# Patient Record
Sex: FEMALE | Race: BLACK OR AFRICAN AMERICAN | NOT HISPANIC OR LATINO
[De-identification: names, ages, dates, MRNs, and addresses within clinical notes are randomized per-mention and may not be internally consistent; named-entity substitution may affect disease eponyms.]

---

## 2017-02-15 ENCOUNTER — APPOINTMENT (OUTPATIENT)
Dept: OTOLARYNGOLOGY | Facility: CLINIC | Age: 77
End: 2017-02-15

## 2017-02-15 VITALS
HEART RATE: 76 BPM | DIASTOLIC BLOOD PRESSURE: 71 MMHG | HEIGHT: 63 IN | BODY MASS INDEX: 27.46 KG/M2 | SYSTOLIC BLOOD PRESSURE: 173 MMHG | WEIGHT: 155 LBS | TEMPERATURE: 98 F

## 2017-02-15 DIAGNOSIS — R49.0 DYSPHONIA: ICD-10-CM

## 2017-02-15 DIAGNOSIS — J32.8 OTHER CHRONIC SINUSITIS: ICD-10-CM

## 2017-02-15 DIAGNOSIS — J38.01 PARALYSIS OF VOCAL CORDS AND LARYNX, UNILATERAL: ICD-10-CM

## 2017-02-15 DIAGNOSIS — J38.2 NODULES OF VOCAL CORDS: ICD-10-CM

## 2017-02-28 ENCOUNTER — OUTPATIENT (OUTPATIENT)
Dept: OUTPATIENT SERVICES | Facility: HOSPITAL | Age: 77
LOS: 1 days | End: 2017-02-28
Payer: MEDICARE

## 2017-02-28 PROCEDURE — 70486 CT MAXILLOFACIAL W/O DYE: CPT | Mod: 26

## 2017-02-28 PROCEDURE — 70486 CT MAXILLOFACIAL W/O DYE: CPT

## 2017-03-07 ENCOUNTER — APPOINTMENT (OUTPATIENT)
Dept: OTOLARYNGOLOGY | Facility: CLINIC | Age: 77
End: 2017-03-07

## 2017-03-31 ENCOUNTER — APPOINTMENT (OUTPATIENT)
Dept: OTOLARYNGOLOGY | Facility: CLINIC | Age: 77
End: 2017-03-31

## 2017-03-31 VITALS
SYSTOLIC BLOOD PRESSURE: 147 MMHG | TEMPERATURE: 97.5 F | DIASTOLIC BLOOD PRESSURE: 75 MMHG | WEIGHT: 155 LBS | HEIGHT: 63 IN | BODY MASS INDEX: 27.46 KG/M2 | HEART RATE: 86 BPM

## 2017-03-31 DIAGNOSIS — Z82.49 FAMILY HISTORY OF ISCHEMIC HEART DISEASE AND OTHER DISEASES OF THE CIRCULATORY SYSTEM: ICD-10-CM

## 2017-03-31 DIAGNOSIS — Z86.79 PERSONAL HISTORY OF OTHER DISEASES OF THE CIRCULATORY SYSTEM: ICD-10-CM

## 2017-03-31 DIAGNOSIS — Z86.39 PERSONAL HISTORY OF OTHER ENDOCRINE, NUTRITIONAL AND METABOLIC DISEASE: ICD-10-CM

## 2017-03-31 DIAGNOSIS — R43.9 UNSPECIFIED DISTURBANCES OF SMELL AND TASTE: ICD-10-CM

## 2017-03-31 DIAGNOSIS — R51 HEADACHE: ICD-10-CM

## 2017-04-12 ENCOUNTER — APPOINTMENT (OUTPATIENT)
Dept: NEUROLOGY | Facility: CLINIC | Age: 77
End: 2017-04-12

## 2017-04-12 VITALS
BODY MASS INDEX: 26.58 KG/M2 | SYSTOLIC BLOOD PRESSURE: 139 MMHG | TEMPERATURE: 98.4 F | OXYGEN SATURATION: 97 % | HEART RATE: 78 BPM | DIASTOLIC BLOOD PRESSURE: 72 MMHG | HEIGHT: 63 IN | WEIGHT: 150 LBS

## 2017-04-12 RX ORDER — BLOOD SUGAR DIAGNOSTIC
STRIP MISCELLANEOUS
Qty: 50 | Refills: 0 | Status: ACTIVE | COMMUNITY
Start: 2017-03-23

## 2017-04-12 RX ORDER — METFORMIN HYDROCHLORIDE 850 MG/1
850 TABLET, COATED ORAL TWICE DAILY
Refills: 0 | Status: ACTIVE | COMMUNITY

## 2017-04-12 RX ORDER — CHOLECALCIFEROL (VITAMIN D3) 10(400)/ML
DROPS ORAL
Qty: 100 | Refills: 0 | Status: ACTIVE | COMMUNITY
Start: 2017-03-23

## 2017-04-12 RX ORDER — LOSARTAN POTASSIUM 25 MG/1
25 TABLET, FILM COATED ORAL DAILY
Refills: 0 | Status: ACTIVE | COMMUNITY

## 2017-04-12 RX ORDER — LOVASTATIN 40 MG/1
40 TABLET ORAL DAILY
Refills: 0 | Status: ACTIVE | COMMUNITY

## 2017-04-12 RX ORDER — LEVOTHYROXINE SODIUM 0.03 MG/1
25 TABLET ORAL DAILY
Refills: 0 | Status: ACTIVE | COMMUNITY

## 2017-04-12 RX ORDER — BLOOD-GLUCOSE METER
70 EACH MISCELLANEOUS
Qty: 100 | Refills: 0 | Status: ACTIVE | COMMUNITY
Start: 2017-03-24

## 2017-04-12 RX ORDER — CARVEDILOL 3.12 MG/1
3.12 TABLET, FILM COATED ORAL
Refills: 0 | Status: ACTIVE | COMMUNITY

## 2017-05-24 ENCOUNTER — APPOINTMENT (OUTPATIENT)
Dept: NEUROLOGY | Facility: CLINIC | Age: 77
End: 2017-05-24

## 2017-05-24 VITALS
WEIGHT: 150 LBS | SYSTOLIC BLOOD PRESSURE: 152 MMHG | DIASTOLIC BLOOD PRESSURE: 70 MMHG | TEMPERATURE: 98.4 F | OXYGEN SATURATION: 98 % | BODY MASS INDEX: 26.58 KG/M2 | HEIGHT: 63 IN | HEART RATE: 77 BPM

## 2017-05-30 ENCOUNTER — APPOINTMENT (OUTPATIENT)
Dept: NEUROLOGY | Facility: CLINIC | Age: 77
End: 2017-05-30

## 2017-05-30 VITALS
OXYGEN SATURATION: 100 % | SYSTOLIC BLOOD PRESSURE: 150 MMHG | HEART RATE: 69 BPM | DIASTOLIC BLOOD PRESSURE: 75 MMHG | BODY MASS INDEX: 26.58 KG/M2 | HEIGHT: 63 IN | WEIGHT: 150 LBS

## 2017-07-26 ENCOUNTER — APPOINTMENT (OUTPATIENT)
Dept: NEUROLOGY | Facility: CLINIC | Age: 77
End: 2017-07-26

## 2018-06-27 ENCOUNTER — APPOINTMENT (OUTPATIENT)
Dept: ENDOCRINOLOGY | Facility: CLINIC | Age: 78
End: 2018-06-27
Payer: MEDICARE

## 2018-06-27 PROCEDURE — 99204 OFFICE O/P NEW MOD 45 MIN: CPT

## 2018-06-28 VITALS
HEART RATE: 77 BPM | WEIGHT: 146 LBS | HEIGHT: 63 IN | SYSTOLIC BLOOD PRESSURE: 127 MMHG | DIASTOLIC BLOOD PRESSURE: 61 MMHG | BODY MASS INDEX: 25.87 KG/M2

## 2018-07-02 LAB
24R-OH-CALCIDIOL SERPL-MCNC: 122 PG/ML
25(OH)D3 SERPL-MCNC: 33.7 NG/ML
ALBUMIN SERPL ELPH-MCNC: 4.4 G/DL
ALP BLD-CCNC: 82 U/L
ALT SERPL-CCNC: 17 U/L
ANION GAP SERPL CALC-SCNC: 16 MMOL/L
AST SERPL-CCNC: 17 U/L
BASOPHILS # BLD AUTO: 0.01 K/UL
BASOPHILS NFR BLD AUTO: 0.2 %
BILIRUB SERPL-MCNC: 0.3 MG/DL
BUN SERPL-MCNC: 11 MG/DL
CALCIUM SERPL-MCNC: 10.5 MG/DL
CALCIUM SERPL-MCNC: 10.5 MG/DL
CHLORIDE SERPL-SCNC: 102 MMOL/L
CHOLEST SERPL-MCNC: 147 MG/DL
CHOLEST/HDLC SERPL: 2 RATIO
CO2 SERPL-SCNC: 24 MMOL/L
CREAT SERPL-MCNC: 0.73 MG/DL
CREAT SPEC-SCNC: 53 MG/DL
DHEA-SULFATE, SERUM: 50 UG/DL
EOSINOPHIL # BLD AUTO: 0.08 K/UL
EOSINOPHIL NFR BLD AUTO: 1.7 %
GLUCOSE SERPL-MCNC: 142 MG/DL
HBA1C MFR BLD HPLC: 7.4 %
HCT VFR BLD CALC: 41.2 %
HDLC SERPL-MCNC: 73 MG/DL
HGB BLD-MCNC: 13.3 G/DL
IMM GRANULOCYTES NFR BLD AUTO: 0.2 %
LDLC SERPL CALC-MCNC: 45 MG/DL
LYMPHOCYTES # BLD AUTO: 1.64 K/UL
LYMPHOCYTES NFR BLD AUTO: 34.5 %
MAN DIFF?: NORMAL
MCHC RBC-ENTMCNC: 27 PG
MCHC RBC-ENTMCNC: 32.3 GM/DL
MCV RBC AUTO: 83.6 FL
MICROALBUMIN 24H UR DL<=1MG/L-MCNC: <0.3 MG/DL
MICROALBUMIN/CREAT 24H UR-RTO: NORMAL
MONOCYTES # BLD AUTO: 0.4 K/UL
MONOCYTES NFR BLD AUTO: 8.4 %
NEUTROPHILS # BLD AUTO: 2.61 K/UL
NEUTROPHILS NFR BLD AUTO: 55 %
PARATHYROID HORMONE INTACT: 28 PG/ML
PLATELET # BLD AUTO: 255 K/UL
POTASSIUM SERPL-SCNC: 3.8 MMOL/L
PROT SERPL-MCNC: 7.2 G/DL
RBC # BLD: 4.93 M/UL
RBC # FLD: 13.9 %
SODIUM SERPL-SCNC: 142 MMOL/L
T3FREE SERPL-MCNC: 2.73 PG/ML
T4 FREE SERPL-MCNC: 1.4 NG/DL
TESTOST BND SERPL-MCNC: 2 PG/ML
TESTOST SERPL-MCNC: 26.5 NG/DL
THYROGLOB AB SERPL-ACNC: <20 IU/ML
THYROPEROXIDASE AB SERPL IA-ACNC: 385 IU/ML
TRIGL SERPL-MCNC: 147 MG/DL
TSH SERPL-ACNC: 2.04 UIU/ML
TSI ACT/NOR SER: <0.1 IU/L
WBC # FLD AUTO: 4.75 K/UL

## 2018-10-04 ENCOUNTER — EMERGENCY (EMERGENCY)
Facility: HOSPITAL | Age: 78
LOS: 1 days | Discharge: HOME | End: 2018-10-04
Admitting: EMERGENCY MEDICINE
Payer: COMMERCIAL

## 2018-10-04 VITALS
WEIGHT: 148.59 LBS | HEIGHT: 63 IN | OXYGEN SATURATION: 98 % | SYSTOLIC BLOOD PRESSURE: 191 MMHG | DIASTOLIC BLOOD PRESSURE: 71 MMHG | RESPIRATION RATE: 18 BRPM | TEMPERATURE: 98 F | HEART RATE: 82 BPM

## 2018-10-04 DIAGNOSIS — Z79.899 OTHER LONG TERM (CURRENT) DRUG THERAPY: ICD-10-CM

## 2018-10-04 DIAGNOSIS — M79.18 MYALGIA, OTHER SITE: ICD-10-CM

## 2018-10-04 DIAGNOSIS — Z79.1 LONG TERM (CURRENT) USE OF NON-STEROIDAL ANTI-INFLAMMATORIES (NSAID): ICD-10-CM

## 2018-10-04 DIAGNOSIS — R10.30 LOWER ABDOMINAL PAIN, UNSPECIFIED: ICD-10-CM

## 2018-10-04 DIAGNOSIS — I10 ESSENTIAL (PRIMARY) HYPERTENSION: ICD-10-CM

## 2018-10-04 PROCEDURE — 99283 EMERGENCY DEPT VISIT LOW MDM: CPT

## 2018-10-04 PROCEDURE — 96372 THER/PROPH/DIAG INJ SC/IM: CPT

## 2018-10-04 PROCEDURE — 73502 X-RAY EXAM HIP UNI 2-3 VIEWS: CPT | Mod: 26,LT

## 2018-10-04 PROCEDURE — 73502 X-RAY EXAM HIP UNI 2-3 VIEWS: CPT

## 2018-10-04 PROCEDURE — 99283 EMERGENCY DEPT VISIT LOW MDM: CPT | Mod: 25

## 2018-10-04 RX ORDER — METHOCARBAMOL 500 MG/1
1 TABLET, FILM COATED ORAL
Qty: 15 | Refills: 0 | OUTPATIENT
Start: 2018-10-04 | End: 2018-10-08

## 2018-10-04 RX ORDER — KETOROLAC TROMETHAMINE 30 MG/ML
15 SYRINGE (ML) INJECTION ONCE
Qty: 0 | Refills: 0 | Status: DISCONTINUED | OUTPATIENT
Start: 2018-10-04 | End: 2018-10-04

## 2018-10-04 RX ADMIN — Medication 15 MILLIGRAM(S): at 14:34

## 2018-10-04 NOTE — ED PROVIDER NOTE - PHYSICAL EXAMINATION
Left groin, no swelling, erythema, cellulitis, no mass. No back or spinal pain. no motor or sensory deficit, NVI.  Pain was reproducible on exam.

## 2018-10-04 NOTE — ED PROVIDER NOTE - MEDICAL DECISION MAKING DETAILS
Patient with groin pain most likely a muscle strain. Pain was reproducible on exam. Able to ambulate , x rays negative. Recommend f/u with ortho. Ice, rest and heat. Pain meds prn

## 2018-10-04 NOTE — ED ADULT TRIAGE NOTE - CHIEF COMPLAINT QUOTE
c/o left groin pain travelling to E and buttocks x 1 week. Pain is worse with walking. Denies recent fall or injury

## 2018-10-04 NOTE — ED PROVIDER NOTE - CHPI ED SYMPTOMS NEG
no hematuria/no dysuria/no fever/no burning urination/no abdominal distension/no vomiting/no chills/no nausea/no diarrhea

## 2018-10-04 NOTE — ED ADULT NURSE NOTE - OBJECTIVE STATEMENT
c/o left groin pain travelling to E and buttocks x 1 week. Pain is worse with walking. Denies recent fall or injury, burning/frequency in urination

## 2018-10-04 NOTE — ED ADULT NURSE NOTE - NSIMPLEMENTINTERV_GEN_ALL_ED
Implemented All Universal Safety Interventions:  [place] to call system. Call bell, personal items and telephone within reach. Instruct patient to call for assistance. Room bathroom lighting operational. Non-slip footwear when patient is off stretcher. Physically safe environment: no spills, clutter or unnecessary equipment. Stretcher in lowest position, wheels locked, appropriate side rails in place.

## 2018-10-04 NOTE — ED PROVIDER NOTE - OBJECTIVE STATEMENT
78 y/o f with c/o left groin pain worsen with movement upon standing and walking. She state of similar episode in the past with the same strain. She report of moving furniture in her home. She state of pain unrelieved with OTC med. Denies fall, injury, abd pain, dysuria, paresthesia, paresis, incontinence. No sob, chest pain, n, v, d, or h/o joint surgeries.

## 2019-05-09 PROBLEM — I10 ESSENTIAL (PRIMARY) HYPERTENSION: Chronic | Status: ACTIVE | Noted: 2018-10-09

## 2019-06-20 ENCOUNTER — APPOINTMENT (OUTPATIENT)
Dept: ENDOCRINOLOGY | Facility: CLINIC | Age: 79
End: 2019-06-20
Payer: MEDICARE

## 2019-06-20 VITALS
WEIGHT: 146 LBS | DIASTOLIC BLOOD PRESSURE: 77 MMHG | BODY MASS INDEX: 25.87 KG/M2 | SYSTOLIC BLOOD PRESSURE: 126 MMHG | HEIGHT: 63 IN | HEART RATE: 80 BPM

## 2019-06-20 DIAGNOSIS — E03.9 HYPOTHYROIDISM, UNSPECIFIED: ICD-10-CM

## 2019-06-20 DIAGNOSIS — L81.9 DISORDER OF PIGMENTATION, UNSPECIFIED: ICD-10-CM

## 2019-06-20 DIAGNOSIS — E11.65 TYPE 2 DIABETES MELLITUS WITH HYPERGLYCEMIA: ICD-10-CM

## 2019-06-20 DIAGNOSIS — N95.1 MENOPAUSAL AND FEMALE CLIMACTERIC STATES: ICD-10-CM

## 2019-06-20 LAB
GLUCOSE BLDC GLUCOMTR-MCNC: 183
HBA1C MFR BLD HPLC: 7.4

## 2019-06-20 PROCEDURE — 82962 GLUCOSE BLOOD TEST: CPT

## 2019-06-20 PROCEDURE — 83036 HEMOGLOBIN GLYCOSYLATED A1C: CPT | Mod: QW

## 2019-06-20 PROCEDURE — 99215 OFFICE O/P EST HI 40 MIN: CPT | Mod: 25

## 2019-06-21 NOTE — PHYSICAL EXAM
[Alert] : alert [No Acute Distress] : no acute distress [Well Nourished] : well nourished [Well Developed] : well developed [Normal Sclera/Conjunctiva] : normal sclera/conjunctiva [EOMI] : extra ocular movement intact [No Proptosis] : no proptosis [Thyroid Not Enlarged] : the thyroid was not enlarged [Normal Oropharynx] : the oropharynx was normal [No Thyroid Nodules] : there were no palpable thyroid nodules [No Respiratory Distress] : no respiratory distress [No Accessory Muscle Use] : no accessory muscle use [Clear to Auscultation] : lungs were clear to auscultation bilaterally [Normal Rate] : heart rate was normal  [Normal S1, S2] : normal S1 and S2 [Regular Rhythm] : with a regular rhythm [Pedal Pulses Normal] : the pedal pulses are present [No Edema] : there was no peripheral edema [Normal Bowel Sounds] : normal bowel sounds [Not Tender] : non-tender [Soft] : abdomen soft [Not Distended] : not distended [Post Cervical Nodes] : posterior cervical nodes [Anterior Cervical Nodes] : anterior cervical nodes [Axillary Nodes] : axillary nodes [Normal] : normal and non tender [No Spinal Tenderness] : no spinal tenderness [Spine Straight] : spine straight [No Stigmata of Cushings Syndrome] : no stigmata of cushings syndrome [No Rash] : no rash [Normal Strength/Tone] : muscle strength and tone were normal [Normal Gait] : normal gait [Acanthosis Nigricans] : no acanthosis nigricans [Normal Reflexes] : deep tendon reflexes were 2+ and symmetric [No Tremors] : no tremors [Oriented x3] : oriented to person, place, and time

## 2019-06-21 NOTE — HISTORY OF PRESENT ILLNESS
[FreeTextEntry1] : 77 yo female with h/o hypothyrodism on levothyroxine for 3 -4 yrs, 25 mcg daily\par \par Initial visit\par Also with dm type 2 metformin 850 mg twice daily\par POC A1c 7.2 today\par Feeling hot/cold for the past year\par + thinning of hair\par not fatigued, does not sleep well\par feels like food stuck in the throat\par per referral thyroid ultrasound heterogenous micronodular c/w hashimotos\par eats well healthy and exercises\par \par 6/2019: Here for /fu, generally feels well and endorses no acute complaints. No interval events since LV. Today reports ongoing hot flashes, every other day and succeeded by stable migraine HAs. they do not interfere w/ her ADL and are not impacting QoL per her. She otherwise denies any f/c, CP, SOB, palpitations, tremors, depressed mood, anxiety, palpitations, n/v, stool/urinary abn, skin/weight changes, heat/cold intolerance, HAs, breast/nipple changes, polyuria/polydipsia/nocturia or other complaints.\par \par

## 2019-06-21 NOTE — ASSESSMENT
[FreeTextEntry1] : check labs and adjust synthroid as needed\par DM well controlled on metformin only, UTD for 2019 vaccines and podiatry/ophtho referrals.\par RD to see\par continue metformin 850 mg twice daily does not want to increase\par optho and podiatry UTD\par BP at goal on ARB\par Check lipids on the NV, cont lovastatin\par reviewed non pharmacologic strategies for management of hot flashes, reviewed s/e and risks of HRT. she would prefer a non pharmacologic approach for now. consider Effexor if symptoms persist.\par \par hyperpigmentation noted, but there are no signs of cushing or AI. labs for adrenal evaluation provided.

## 2019-07-03 LAB
ACTH SER-ACNC: 23.7 PG/ML
ALBUMIN SERPL ELPH-MCNC: 4.6 G/DL
ALP BLD-CCNC: 62 U/L
ALT SERPL-CCNC: 17 U/L
ANION GAP SERPL CALC-SCNC: 13 MMOL/L
AST SERPL-CCNC: 15 U/L
BILIRUB SERPL-MCNC: 0.4 MG/DL
BUN SERPL-MCNC: 11 MG/DL
CALCIUM SERPL-MCNC: 9.6 MG/DL
CHLORIDE SERPL-SCNC: 103 MMOL/L
CO2 SERPL-SCNC: 24 MMOL/L
CORTIS 24H UR-MCNC: 24 H
CORTIS 24H UR-MRATE: 3.8 MCG/24 H
CORTIS SERPL-MCNC: 8.3 UG/DL
CREAT SERPL-MCNC: 0.65 MG/DL
DHEA-S SERPL-MCNC: 50.7 UG/DL
FOLATE SERPL-MCNC: >20 NG/ML
GLUCOSE SERPL-MCNC: 172 MG/DL
IGF-1 INTERP: NORMAL
IGF-I BLD-MCNC: 147 NG/ML
POTASSIUM SERPL-SCNC: 4.4 MMOL/L
PROLACTIN SERPL-MCNC: 6.5 NG/ML
PROLACTIN SERPL-MCNC: 6.9 NG/ML
PROT SERPL-MCNC: 7.2 G/DL
SODIUM SERPL-SCNC: 140 MMOL/L
SPECIMEN VOL 24H UR: 1200 ML
T4 FREE SERPL-MCNC: 1.3 NG/DL
TSH SERPL-ACNC: 2.76 UIU/ML
VIT B12 SERPL-MCNC: 346 PG/ML

## 2019-12-19 ENCOUNTER — APPOINTMENT (OUTPATIENT)
Dept: ENDOCRINOLOGY | Facility: CLINIC | Age: 79
End: 2019-12-19

## 2020-06-02 ENCOUNTER — APPOINTMENT (OUTPATIENT)
Dept: OTOLARYNGOLOGY | Facility: CLINIC | Age: 80
End: 2020-06-02
Payer: MEDICARE

## 2020-06-02 VITALS
HEART RATE: 74 BPM | WEIGHT: 148.2 LBS | TEMPERATURE: 95.2 F | DIASTOLIC BLOOD PRESSURE: 73 MMHG | SYSTOLIC BLOOD PRESSURE: 165 MMHG | BODY MASS INDEX: 26.26 KG/M2 | HEIGHT: 63 IN

## 2020-06-02 DIAGNOSIS — R20.8 OTHER DISTURBANCES OF SKIN SENSATION: ICD-10-CM

## 2020-06-02 DIAGNOSIS — R51 HEADACHE: ICD-10-CM

## 2020-06-02 PROCEDURE — 99204 OFFICE O/P NEW MOD 45 MIN: CPT

## 2020-06-02 NOTE — ASSESSMENT
[FreeTextEntry1] : 79F here for initial evaluation. For the past 2-3 months, she reports sensation of burning in her nose on the left side. This is intermittently accompanied by discomfort/pain in the left eye and shooting pain over the left scalp.\par She thinks the sx get worse when it is hot outside. There is no drainage or foul nasal odor, no h/o sinusitis.\par At baseline, she has known, mild asymmetric left sided SNHL and tinnitus and has seen ENTs for that in the past. Prior Audiogram in chart as well as MRIs and CT, the latter two of which are normal. She has also been seen for headache and facial pain at that time, but had normal exam and had been recommended to followup with neurologist. Complete and comprehensive head and neck exam is otherwise unremarkable.\par Left sided nasal/facial/scalp/orbital sx likely due to facial pain/headache syndrome; there is no e/o sinusitis both on exam or prior imaging. Will send to neurologist for formal evaluation and treatment. All questions answered.\par

## 2020-06-02 NOTE — CONSULT LETTER
[Dear  ___] : Dear  [unfilled], [Courtesy Letter:] : I had the pleasure of seeing your patient, [unfilled], in my office today. [Consult Closing:] : Thank you very much for allowing me to participate in the care of this patient.  If you have any questions, please do not hesitate to contact me. [Sincerely,] : Sincerely, [FreeTextEntry3] : Ministerio Liu MD\par Department of Otolaryngology - Head and Neck Surgery\par [facility]

## 2020-06-02 NOTE — HISTORY OF PRESENT ILLNESS
[de-identified] : 79F here for initial evaluation.\par \par For the past 2-3 months, she reports sensation of burning in her nose on the left side. This is intermittently accompanied by discomfort/pain in the left eye and shooting pain over the left scalp.\par She thinks the sx get worse when it is hot outside. There is no drainage or foul nasal odor, no h/o sinusitis.\par There are otherwise no other acute complaints.\par \par At baseline, she has mild asymmetric left sided SNHL and tinnitus and has seen ENTs for that in the past. Prior Audiogram in chart as well as MRIs and CT, both of which are normal. She has also been seen for headache and facial pain at that time, but had normal exam and had been recommended to followup with neurologist.\par \par ROs otherwise unremarkable.

## 2021-05-11 DIAGNOSIS — N39.0 URINARY TRACT INFECTION, SITE NOT SPECIFIED: ICD-10-CM

## 2021-05-12 ENCOUNTER — APPOINTMENT (OUTPATIENT)
Dept: UROGYNECOLOGY | Facility: CLINIC | Age: 81
End: 2021-05-12
Payer: MEDICARE

## 2021-05-12 DIAGNOSIS — N95.2 POSTMENOPAUSAL ATROPHIC VAGINITIS: ICD-10-CM

## 2021-05-12 DIAGNOSIS — N39.46 MIXED INCONTINENCE: ICD-10-CM

## 2021-05-12 PROCEDURE — 51798 US URINE CAPACITY MEASURE: CPT

## 2021-05-12 PROCEDURE — 99204 OFFICE O/P NEW MOD 45 MIN: CPT

## 2021-05-12 PROCEDURE — 99072 ADDL SUPL MATRL&STAF TM PHE: CPT

## 2021-05-12 RX ORDER — ESTRADIOL 0.1 MG/G
0.1 CREAM VAGINAL
Qty: 1 | Refills: 3 | Status: ACTIVE | COMMUNITY
Start: 2021-05-12 | End: 1900-01-01

## 2021-05-12 NOTE — ASSESSMENT
[FreeTextEntry1] : Today's findings were discussed with the pt and written pamphlets were provided for vaginal atrophy and urinary incontinence.  After discussing various options of treatment, she opted to proceed with further evaluation and treatment based on today's findings.  She will be scheduled for urodynamics and office cystoscopy.  Also, her urine was sent for culture today to rule out an infectious cause of her symptoms..\par \par \par

## 2021-05-12 NOTE — HISTORY OF PRESENT ILLNESS
[FreeTextEntry1] : The pt is a 79 y/o P1 with constant leakage for 10 years and vaginal heaviness x 1 year, with a bothersome level of 10/10.\par She leaks urine with coughing, sneezing, laughing and urgency. \par She feels incomplete emptying of her bladder.\par She voids every 2-3 hrs with a medium volume and denies nocturia but frequently wakes up wet \par Her liquid intake consists of 6-7 glasses of water, occasional cup of tea, 2-3 cups of milk/day.\par She has a BM q daily. Reports incomplete defecation. Denies splinting.\par She denies gross hematuria, recurrent UTIs, hx of nephrolithiaisis. reports vaginal bulge/heaviness.\par Her last PAP was 10+ years ago, and she denies a hx of abnormal PAP.\par The last time she had intercourse was 40 years ago. \par Her abdominal surgical hx is significant for D&C x 3 . She  denies surgical complications. \par \par

## 2021-05-12 NOTE — PHYSICAL EXAM
[No Acute Distress] : in no acute distress [Well developed] : well developed [Well Nourished] : ~L well nourished [Good Hygeine] : demonstrates good hygeine [Oriented x3] : oriented to person, place, and time [Normal Memory] : ~T memory was ~L unimpaired [Normal Mood/Affect] : mood and affect are normal [Normal Lung Sounds] : the lungs were clear to auscultation [Respirations regular] : ~T respiratory rate was regular [Rate & Rhythm Regular] : ~T heart rate and rhythm were normal [No Edema] : ~T edema was not present [Supple] : ~T the neck demonstrated no ~M decrease in suppleness [Thyroid Normal] : the thyroid ~T showed no abnormalities [Symmetrical] : the neck was ~L symmetrical [Normal Gait] : gait was normal [Labia Majora] : were normal [Labia Minora] : were normal [Bartholin's Gland] : both Bartholin's glands were normal  [Normal Appearance] : general appearance was normal [Atrophy] : atrophy [No Bleeding] : there was no active vaginal bleeding [Normal] : no abnormalities [Post Void Residual ____ml] : post void residual was [unfilled] ml [Exam Deferred] : was deferred [Anxiety] : patient is not anxious [Cough] : no cough [Dyspnea] : no dyspnea [Murmurs] : no murmurs were heard [Varicose Veins] : no varicose veins observed [Tracheal Deviation] : no tracheal deviation observed [Mass] : no ~M [unfilled] neck mass was observed [Mass (___ Cm)] : no ~M [unfilled] abdominal mass was palpated [Tenderness] : ~T no ~M abdominal tenderness observed [Distended] : not distended [H/Smegaly] : no hepatosplenomegaly [Hernia] : no hernia observed [Scar] : no scars [Estrogen Effect] : no estrogen effect was observed [de-identified] : no sig prolapse

## 2021-05-13 VITALS
DIASTOLIC BLOOD PRESSURE: 71 MMHG | HEIGHT: 63 IN | BODY MASS INDEX: 27.11 KG/M2 | WEIGHT: 153 LBS | SYSTOLIC BLOOD PRESSURE: 147 MMHG | HEART RATE: 98 BPM | OXYGEN SATURATION: 98 %

## 2021-05-15 ENCOUNTER — NON-APPOINTMENT (OUTPATIENT)
Age: 81
End: 2021-05-15

## 2021-05-15 LAB — BACTERIA UR CULT: NORMAL

## 2021-05-25 ENCOUNTER — APPOINTMENT (OUTPATIENT)
Dept: UROGYNECOLOGY | Facility: CLINIC | Age: 81
End: 2021-05-25

## 2021-06-16 ENCOUNTER — APPOINTMENT (OUTPATIENT)
Dept: UROGYNECOLOGY | Facility: CLINIC | Age: 81
End: 2021-06-16

## 2021-07-30 ENCOUNTER — EMERGENCY (EMERGENCY)
Facility: HOSPITAL | Age: 81
LOS: 1 days | Discharge: HOME | End: 2021-07-30
Attending: EMERGENCY MEDICINE | Admitting: EMERGENCY MEDICINE
Payer: MEDICARE

## 2021-07-30 VITALS
HEIGHT: 63 IN | RESPIRATION RATE: 16 BRPM | HEART RATE: 85 BPM | WEIGHT: 149.91 LBS | DIASTOLIC BLOOD PRESSURE: 81 MMHG | TEMPERATURE: 99 F | SYSTOLIC BLOOD PRESSURE: 191 MMHG | OXYGEN SATURATION: 96 %

## 2021-07-30 VITALS
TEMPERATURE: 98 F | OXYGEN SATURATION: 98 % | SYSTOLIC BLOOD PRESSURE: 153 MMHG | RESPIRATION RATE: 16 BRPM | DIASTOLIC BLOOD PRESSURE: 75 MMHG | HEART RATE: 69 BPM

## 2021-07-30 DIAGNOSIS — E78.5 HYPERLIPIDEMIA, UNSPECIFIED: ICD-10-CM

## 2021-07-30 DIAGNOSIS — R42 DIZZINESS AND GIDDINESS: ICD-10-CM

## 2021-07-30 DIAGNOSIS — I10 ESSENTIAL (PRIMARY) HYPERTENSION: ICD-10-CM

## 2021-07-30 DIAGNOSIS — H55.09 OTHER FORMS OF NYSTAGMUS: ICD-10-CM

## 2021-07-30 DIAGNOSIS — I25.10 ATHEROSCLEROTIC HEART DISEASE OF NATIVE CORONARY ARTERY WITHOUT ANGINA PECTORIS: ICD-10-CM

## 2021-07-30 DIAGNOSIS — E11.9 TYPE 2 DIABETES MELLITUS WITHOUT COMPLICATIONS: ICD-10-CM

## 2021-07-30 DIAGNOSIS — Z79.2 LONG TERM (CURRENT) USE OF ANTIBIOTICS: ICD-10-CM

## 2021-07-30 DIAGNOSIS — E03.9 HYPOTHYROIDISM, UNSPECIFIED: ICD-10-CM

## 2021-07-30 LAB
ALBUMIN SERPL ELPH-MCNC: 4.6 G/DL — SIGNIFICANT CHANGE UP (ref 3.3–5)
ALP SERPL-CCNC: 78 U/L — SIGNIFICANT CHANGE UP (ref 40–120)
ALT FLD-CCNC: 19 U/L — SIGNIFICANT CHANGE UP (ref 10–45)
ANION GAP SERPL CALC-SCNC: 11 MMOL/L — SIGNIFICANT CHANGE UP (ref 5–17)
APPEARANCE UR: CLEAR — SIGNIFICANT CHANGE UP
AST SERPL-CCNC: 20 U/L — SIGNIFICANT CHANGE UP (ref 10–40)
BACTERIA # UR AUTO: PRESENT /HPF
BASOPHILS # BLD AUTO: 0.03 K/UL — SIGNIFICANT CHANGE UP (ref 0–0.2)
BASOPHILS NFR BLD AUTO: 0.6 % — SIGNIFICANT CHANGE UP (ref 0–2)
BILIRUB SERPL-MCNC: 0.3 MG/DL — SIGNIFICANT CHANGE UP (ref 0.2–1.2)
BILIRUB UR-MCNC: NEGATIVE — SIGNIFICANT CHANGE UP
BUN SERPL-MCNC: 18 MG/DL — SIGNIFICANT CHANGE UP (ref 7–23)
CALCIUM SERPL-MCNC: 10.5 MG/DL — SIGNIFICANT CHANGE UP (ref 8.4–10.5)
CHLORIDE SERPL-SCNC: 104 MMOL/L — SIGNIFICANT CHANGE UP (ref 96–108)
CK MB CFR SERPL CALC: <1 NG/ML — SIGNIFICANT CHANGE UP (ref 0–6.7)
CK SERPL-CCNC: 57 U/L — SIGNIFICANT CHANGE UP (ref 25–170)
CO2 SERPL-SCNC: 25 MMOL/L — SIGNIFICANT CHANGE UP (ref 22–31)
COLOR SPEC: YELLOW — SIGNIFICANT CHANGE UP
COMMENT - URINE: SIGNIFICANT CHANGE UP
CREAT SERPL-MCNC: 0.79 MG/DL — SIGNIFICANT CHANGE UP (ref 0.5–1.3)
DIFF PNL FLD: NEGATIVE — SIGNIFICANT CHANGE UP
EOSINOPHIL # BLD AUTO: 0.1 K/UL — SIGNIFICANT CHANGE UP (ref 0–0.5)
EOSINOPHIL NFR BLD AUTO: 2 % — SIGNIFICANT CHANGE UP (ref 0–6)
EPI CELLS # UR: SIGNIFICANT CHANGE UP /HPF (ref 0–5)
GLUCOSE SERPL-MCNC: 100 MG/DL — HIGH (ref 70–99)
GLUCOSE UR QL: NEGATIVE — SIGNIFICANT CHANGE UP
HCT VFR BLD CALC: 42 % — SIGNIFICANT CHANGE UP (ref 34.5–45)
HGB BLD-MCNC: 14.2 G/DL — SIGNIFICANT CHANGE UP (ref 11.5–15.5)
IMM GRANULOCYTES NFR BLD AUTO: 0.2 % — SIGNIFICANT CHANGE UP (ref 0–1.5)
KETONES UR-MCNC: NEGATIVE — SIGNIFICANT CHANGE UP
LEUKOCYTE ESTERASE UR-ACNC: ABNORMAL
LYMPHOCYTES # BLD AUTO: 1.47 K/UL — SIGNIFICANT CHANGE UP (ref 1–3.3)
LYMPHOCYTES # BLD AUTO: 28.9 % — SIGNIFICANT CHANGE UP (ref 13–44)
MCHC RBC-ENTMCNC: 28.3 PG — SIGNIFICANT CHANGE UP (ref 27–34)
MCHC RBC-ENTMCNC: 33.8 GM/DL — SIGNIFICANT CHANGE UP (ref 32–36)
MCV RBC AUTO: 83.8 FL — SIGNIFICANT CHANGE UP (ref 80–100)
MONOCYTES # BLD AUTO: 0.44 K/UL — SIGNIFICANT CHANGE UP (ref 0–0.9)
MONOCYTES NFR BLD AUTO: 8.7 % — SIGNIFICANT CHANGE UP (ref 2–14)
NEUTROPHILS # BLD AUTO: 3.03 K/UL — SIGNIFICANT CHANGE UP (ref 1.8–7.4)
NEUTROPHILS NFR BLD AUTO: 59.6 % — SIGNIFICANT CHANGE UP (ref 43–77)
NITRITE UR-MCNC: NEGATIVE — SIGNIFICANT CHANGE UP
NRBC # BLD: 0 /100 WBCS — SIGNIFICANT CHANGE UP (ref 0–0)
PH UR: 6 — SIGNIFICANT CHANGE UP (ref 5–8)
PLATELET # BLD AUTO: 244 K/UL — SIGNIFICANT CHANGE UP (ref 150–400)
POTASSIUM SERPL-MCNC: 4.6 MMOL/L — SIGNIFICANT CHANGE UP (ref 3.5–5.3)
POTASSIUM SERPL-SCNC: 4.6 MMOL/L — SIGNIFICANT CHANGE UP (ref 3.5–5.3)
PROT SERPL-MCNC: 7.8 G/DL — SIGNIFICANT CHANGE UP (ref 6–8.3)
PROT UR-MCNC: NEGATIVE MG/DL — SIGNIFICANT CHANGE UP
RBC # BLD: 5.01 M/UL — SIGNIFICANT CHANGE UP (ref 3.8–5.2)
RBC # FLD: 13.3 % — SIGNIFICANT CHANGE UP (ref 10.3–14.5)
RBC CASTS # UR COMP ASSIST: < 5 /HPF — SIGNIFICANT CHANGE UP
SODIUM SERPL-SCNC: 140 MMOL/L — SIGNIFICANT CHANGE UP (ref 135–145)
SP GR SPEC: 1.01 — SIGNIFICANT CHANGE UP (ref 1–1.03)
TROPONIN T SERPL-MCNC: <0.01 NG/ML — SIGNIFICANT CHANGE UP (ref 0–0.01)
UROBILINOGEN FLD QL: 0.2 E.U./DL — SIGNIFICANT CHANGE UP
WBC # BLD: 5.08 K/UL — SIGNIFICANT CHANGE UP (ref 3.8–10.5)
WBC # FLD AUTO: 5.08 K/UL — SIGNIFICANT CHANGE UP (ref 3.8–10.5)
WBC UR QL: < 5 /HPF — SIGNIFICANT CHANGE UP

## 2021-07-30 PROCEDURE — 99284 EMERGENCY DEPT VISIT MOD MDM: CPT | Mod: 25

## 2021-07-30 PROCEDURE — G1004: CPT

## 2021-07-30 PROCEDURE — 82550 ASSAY OF CK (CPK): CPT

## 2021-07-30 PROCEDURE — 82962 GLUCOSE BLOOD TEST: CPT

## 2021-07-30 PROCEDURE — 85025 COMPLETE CBC W/AUTO DIFF WBC: CPT

## 2021-07-30 PROCEDURE — 80053 COMPREHEN METABOLIC PANEL: CPT

## 2021-07-30 PROCEDURE — 74019 RADEX ABDOMEN 2 VIEWS: CPT

## 2021-07-30 PROCEDURE — 93010 ELECTROCARDIOGRAM REPORT: CPT

## 2021-07-30 PROCEDURE — 74019 RADEX ABDOMEN 2 VIEWS: CPT | Mod: 26

## 2021-07-30 PROCEDURE — 70450 CT HEAD/BRAIN W/O DYE: CPT | Mod: 26,MG

## 2021-07-30 PROCEDURE — 87086 URINE CULTURE/COLONY COUNT: CPT

## 2021-07-30 PROCEDURE — 70450 CT HEAD/BRAIN W/O DYE: CPT | Mod: MG

## 2021-07-30 PROCEDURE — 81001 URINALYSIS AUTO W/SCOPE: CPT

## 2021-07-30 PROCEDURE — 99285 EMERGENCY DEPT VISIT HI MDM: CPT | Mod: 25

## 2021-07-30 PROCEDURE — 82553 CREATINE MB FRACTION: CPT

## 2021-07-30 PROCEDURE — 96360 HYDRATION IV INFUSION INIT: CPT

## 2021-07-30 PROCEDURE — 84484 ASSAY OF TROPONIN QUANT: CPT

## 2021-07-30 PROCEDURE — 36415 COLL VENOUS BLD VENIPUNCTURE: CPT

## 2021-07-30 PROCEDURE — 96361 HYDRATE IV INFUSION ADD-ON: CPT

## 2021-07-30 RX ORDER — POLYETHYLENE GLYCOL 3350 17 G/17G
17 POWDER, FOR SOLUTION ORAL
Qty: 85 | Refills: 0
Start: 2021-07-30 | End: 2021-08-03

## 2021-07-30 RX ORDER — DOCUSATE SODIUM 100 MG
1 CAPSULE ORAL
Qty: 10 | Refills: 0
Start: 2021-07-30 | End: 2021-08-03

## 2021-07-30 RX ORDER — MECLIZINE HCL 12.5 MG
1 TABLET ORAL
Qty: 21 | Refills: 0
Start: 2021-07-30 | End: 2021-08-05

## 2021-07-30 RX ORDER — SODIUM CHLORIDE 9 MG/ML
500 INJECTION INTRAMUSCULAR; INTRAVENOUS; SUBCUTANEOUS ONCE
Refills: 0 | Status: COMPLETED | OUTPATIENT
Start: 2021-07-30 | End: 2021-07-30

## 2021-07-30 RX ORDER — MECLIZINE HCL 12.5 MG
25 TABLET ORAL ONCE
Refills: 0 | Status: COMPLETED | OUTPATIENT
Start: 2021-07-30 | End: 2021-07-30

## 2021-07-30 RX ORDER — MULTIVIT WITH MIN/MFOLATE/K2 340-15/3 G
300 POWDER (GRAM) ORAL
Qty: 30 | Refills: 0
Start: 2021-07-30

## 2021-07-30 RX ADMIN — Medication 25 MILLIGRAM(S): at 19:54

## 2021-07-30 RX ADMIN — Medication 0.5 MILLIGRAM(S): at 21:25

## 2021-07-30 RX ADMIN — SODIUM CHLORIDE 500 MILLILITER(S): 9 INJECTION INTRAMUSCULAR; INTRAVENOUS; SUBCUTANEOUS at 21:00

## 2021-07-30 RX ADMIN — SODIUM CHLORIDE 500 MILLILITER(S): 9 INJECTION INTRAMUSCULAR; INTRAVENOUS; SUBCUTANEOUS at 19:55

## 2021-07-30 NOTE — ED ADULT NURSE NOTE - NSIMPLEMENTINTERV_GEN_ALL_ED
Implemented All Fall Risk Interventions:  [place] to call system. Call bell, personal items and telephone within reach. Instruct patient to call for assistance. Room bathroom lighting operational. Non-slip footwear when patient is off stretcher. Physically safe environment: no spills, clutter or unnecessary equipment. Stretcher in lowest position, wheels locked, appropriate side rails in place. Provide visual cue, wrist band, yellow gown, etc. Monitor gait and stability. Monitor for mental status changes and reorient to person, place, and time. Review medications for side effects contributing to fall risk. Reinforce activity limits and safety measures with patient and family.

## 2021-07-30 NOTE — ED PROVIDER NOTE - PATIENT PORTAL LINK FT
You can access the FollowMyHealth Patient Portal offered by [facility] by registering at the following website: [email or web address]. By joining [employer]’s FollowMyHealth portal, you will also be able to view your health information using other applications (apps) compatible with our system.

## 2021-07-30 NOTE — ED PROVIDER NOTE - OBJECTIVE STATEMENT
81yo female with pmhx of vertigo (last episode 2011), HTN, HLD, CAD, hypothyroid, DM presents with dizziness x4 days. Pt reports onset of dizziness after getting out of bed 4 days ago, described as room spinning that lasted approx. 2hrs before improving but not fully resolving. Pt reports constant mild dizziness since that time, worse with laying flat and turning head. Symptoms are associated with nausea. She states this feels similar to prior episodes of vertigo (2 prior episodes, evaluated by ENT and Neurology at that time). She denies HA, vision changes, tinnitus, recent URI, chest pain, shortness of breath, vomiting, numbness, weakness, syncope, fall. She has not taken anything for her symptoms at home. Of note, pt also c/o mild constipation. Reports daily, small BM, but endorsing sensation of abdominal fullness. She is passing flatus.

## 2021-07-30 NOTE — ED PROVIDER NOTE - PHYSICAL EXAMINATION
VITAL SIGNS: I have reviewed nursing notes and confirm.  CONSTITUTIONAL: Well-developed;  in no acute distress.   SKIN:  warm and dry, no acute rash.   HEAD:  normocephalic, atraumatic.  EYES: PERRL, EOM intact; conjunctiva and sclera clear. She has left beating nystagmus on EOM.   ENT: No nasal discharge; airway clear. TMs clear, non-erythematous, non-edematous.   NECK: Supple; non tender. No meningeal signs.   CARD: S1, S2 normal; no murmurs, gallops, or rubs. Regular rate and rhythm.   RESP:  Clear to auscultation b/l, no wheezes, rales or rhonchi.  ABD: Normal bowel sounds; soft; non-distended; non-tender; no guarding/ rebound.  EXT: Normal ROM. No clubbing, cyanosis or edema. 2+ pulses to b/l ue/le.  NEURO: Alert, oriented, grossly unremarkable. CN II-XII intact. 5/5 strength in all extremities. Sensation equal and intact. Finger to nose intact. No pronator drift. Non-ataxic gait.   PSYCH: Cooperative, mood and affect appropriate.

## 2021-07-30 NOTE — ED PROVIDER NOTE - NSFOLLOWUPINSTRUCTIONS_ED_ALL_ED_FT
Your labs were reassuring today. Your CT head did not show any abnormalities. Your abdominal x-ray showed stool in your colon but no evidence of obstruction.    Dizziness can be caused by the brain or the inner ear. It appears your symptoms are likely caused by the inner ear. Please take meclizine 25mg up to three times daily for dizziness. Drink plenty of clear fluids to stay hydrated. Avoid abrupt head movements or standing up quickly. Please follow up with Neurology and ENT - see referrals below. If you have any difficulty obtaining an appointment, please call our Referrals Coordinator at [phone].     You were prescribed colace and miralax for constipation. Take one tab of colace twice daily to SOFTEN STOOL. Take one cup of miralax nightly to STIMULATE BOWEL MOVEMENT. Drink plenty of clear fluids and add fiber to your diet.     Follow up with your primary care doctor early next week for re-evaluation while awaiting specialty appointments. Return to the Emergency Department if you develop fever>100.4F, headache, vision changes, increased dizziness, vomiting, difficulty walking, numbness, weakness or any other concerns.

## 2021-07-30 NOTE — ED ADULT TRIAGE NOTE - CHIEF COMPLAINT QUOTE
Pt CO Dizziness and Nausea x 4 days.  Pt states "Last time I was in this ER was for the same thing and they said I had Vertigo."  Noted with HTN, EKG in progress.  Denies vomiting, diarrhea, SOB, Fevers, CP.  FSBG 126

## 2021-07-30 NOTE — ED PROVIDER NOTE - PROGRESS NOTE DETAILS
Melody - On reassessment after IVF and meclizine, pt reports resolution of symptoms. ECG, labs unremarkable. CTH and XR abdomen pending.

## 2021-07-30 NOTE — ED PROVIDER NOTE - CLINICAL SUMMARY MEDICAL DECISION MAKING FREE TEXT BOX
She is afebrile and hemodynamically stable. She has a non-focal neurologic exam. She has a left beating horizontal nystagmus. She has a non-ataxic gait. Her symptoms resolved after 500cc IVF, meclizine 25mg po and ativan 0.5mg po. ECG without evidence of arrythmia or acute ischemic changes. Cardiac enzymes unremarkable. No leukocytosis, anemia or acute metabolic abnormality. CTH without evidence of acute intracranial abnormality. XR abdomen reviewed by me and without air fluid levels. Discussed all results with pt and her niece at bedside. Vertigo appears peripheral in nature, do not feel MR is necessary at this time as pt has reassuring history, exam findings and neg CTH. Will give rx for meclizine as well as refer to Neurology and ENT. Will give rx for short course of colace and miralax for constipation. Plan discussed in detail with pt and niece, strict return precautions given. She is afebrile and hemodynamically stable. She has a non-focal neurologic exam. She has a left beating horizontal nystagmus. She has a non-ataxic gait. Her symptoms resolved after 500cc IVF, meclizine 25mg po and ativan 0.5mg po. ECG without evidence of arrythmia or acute ischemic changes. Cardiac enzymes unremarkable. No leukocytosis, anemia or acute metabolic abnormality. UA with trace leuk esterase, no WBCs, she has no urinary sx. CTH without evidence of acute intracranial abnormality. XR abdomen reviewed by me and without air fluid levels. Discussed all results with pt and her niece at bedside. Vertigo appears peripheral in nature, do not feel MR is necessary at this time as pt has reassuring history, exam findings and neg CTH. Will give rx for meclizine as well as refer to Neurology and ENT. Will give rx for short course of colace and miralax for constipation. Plan discussed in detail with pt and niece, strict return precautions given.

## 2021-07-30 NOTE — ED ADULT NURSE NOTE - OBJECTIVE STATEMENT
79 y/o female presents to ED with c/o dizziness and nausea x4 days. Reports it started on Tuesday after getting out of bed, felt so off balance it caused her to fall to floor. Reports dizziness improving but still hasn't gone away. Endorses abdominal bloating, last BM today but "not normal for me." No vomiting, fevers, cp/sob. No neuro deficits - no facial droop, arm drift, unilateral weakness or sensory loss, vision or speech changes.

## 2021-07-30 NOTE — ED PROVIDER NOTE - NS ED ROS FT
Constitutional: No fever. No chills.  Eyes: No redness. No discharge. No vision change.   ENT: No sore throat. No ear pain.  Cardiovascular: No chest pain. No leg swelling.  Respiratory: No cough. No shortness of breath.  GI: No abdominal pain. No vomiting. No diarrhea.   MSK: No joint pain. No back pain.   Skin: No rash. No abrasions.   Neuro: No numbness. No weakness. +dizziness.   Psych: No known mental health issues.

## 2021-08-01 LAB
CULTURE RESULTS: SIGNIFICANT CHANGE UP
SPECIMEN SOURCE: SIGNIFICANT CHANGE UP

## 2021-08-02 PROBLEM — I25.10 ATHEROSCLEROTIC HEART DISEASE OF NATIVE CORONARY ARTERY WITHOUT ANGINA PECTORIS: Chronic | Status: ACTIVE | Noted: 2021-07-30

## 2021-08-02 PROBLEM — E03.9 HYPOTHYROIDISM, UNSPECIFIED: Chronic | Status: ACTIVE | Noted: 2021-07-30

## 2021-08-02 PROBLEM — E11.9 TYPE 2 DIABETES MELLITUS WITHOUT COMPLICATIONS: Chronic | Status: ACTIVE | Noted: 2021-07-30

## 2021-08-30 ENCOUNTER — APPOINTMENT (OUTPATIENT)
Dept: OTOLARYNGOLOGY | Facility: CLINIC | Age: 81
End: 2021-08-30
Payer: MEDICARE

## 2021-08-30 VITALS
SYSTOLIC BLOOD PRESSURE: 154 MMHG | OXYGEN SATURATION: 98 % | DIASTOLIC BLOOD PRESSURE: 76 MMHG | BODY MASS INDEX: 26.22 KG/M2 | HEART RATE: 76 BPM | WEIGHT: 148 LBS | TEMPERATURE: 96.7 F | HEIGHT: 63 IN

## 2021-08-30 DIAGNOSIS — H81.11 BENIGN PAROXYSMAL VERTIGO, RIGHT EAR: ICD-10-CM

## 2021-08-30 DIAGNOSIS — H93.12 TINNITUS, LEFT EAR: ICD-10-CM

## 2021-08-30 DIAGNOSIS — L29.9 PRURITUS, UNSPECIFIED: ICD-10-CM

## 2021-08-30 PROCEDURE — 99213 OFFICE O/P EST LOW 20 MIN: CPT

## 2021-08-30 RX ORDER — MOMETASONE FUROATE 1 MG/ML
0.1 SOLUTION TOPICAL
Qty: 1 | Refills: 2 | Status: ACTIVE | COMMUNITY
Start: 2021-08-30 | End: 1900-01-01

## 2021-08-30 NOTE — REVIEW OF SYSTEMS
[As Noted in HPI] : as noted in HPI [Negative] : Constitutional [FreeTextEntry1] : All other ROS negative

## 2021-08-30 NOTE — PHYSICAL EXAM
[Midline] : trachea located in midline position [Normal] : orientation to person, place, and time: normal [] : Tandem Romberg test is negative [de-identified] : + nystagmus and symptoms on the right.  unable to test on the left due to patient discomfort

## 2021-08-30 NOTE — ASSESSMENT
[FreeTextEntry1] : 79 yo female with vertiginous symptoms x 1 month with previous episodes every couple of years.  Hollis-Bridges Cayce maneuver was positive on the right, due to an onset of vertiginous symptoms unable to continue to the left.  Her history is consistent with benign paroxysmal positional vertigo.   She was unable to try Epley maneuver.   I provided her a [facility] handout and recommended the Epley maneuver as the primary treatment. She can perform the Epley maneuver or half somersault maneuver at home or can be referred for vestibular therapy.\par \par With respect to her tinnitus, I recommend an audio/tymp to further evaluation.  I prescribed mometasone drops for the ear itching.\par \par Follow up in 2 weeks after audio/tymp to re-evaluate\par \par - audio/tymp\par - BPPV handout provided.  Recommend Epley or half somersault maneuver or vestibular therapy.\par - Rx. mometasone for ear itch.\par - F/u after hearing test.

## 2021-08-30 NOTE — HISTORY OF PRESENT ILLNESS
[de-identified] : 80F c/o an episode of room spinning and nausea on 7/30/21. When she tried to get up from bed, she fell down.  Denies loss of consciousness.  It took 5-7 minutes before she could stand up.   She went [facility] ER, EKG, bloodwork, CT head were performed and negative, and she was prescribed meclizine.  She continues to have vertiginous symptoms every other day with head movement, especially to the right, that is corrected by moving her head to the left or neutral position.  She had two similar episodes in the past, the last one in 2013 for several weeks.    \par \par She also reports on and off left ear high-pitched screeching nonpulsatile tinnitus, bilateral ear itching.  She denies hearing loss, otorrhea, otalgia.  No alleviating factors or triggers.  No stressors or caffeine.\par \par No other ENT complaints.

## 2021-08-30 NOTE — DATA REVIEWED
[de-identified] : [facility] ER 7/30/21 ER visit, 3/7/2017 audio/tymp, Enrique Owen and Noe notes

## 2021-08-30 NOTE — CONSULT LETTER
[Dear  ___] : Dear  [unfilled], [Consult Letter:] : I had the pleasure of evaluating your patient, [unfilled]. [Please see my note below.] : Please see my note below. [Consult Closing:] : Thank you very much for allowing me to participate in the care of this patient.  If you have any questions, please do not hesitate to contact me. [Sincerely,] : Sincerely, [FreeTextEntry3] : Felton Knight MD\par Director; [facility]\par Otolaryngology - Head and Neck Surgery\par [facility]\par \par \par Department of Otolaryngology\par [facility]\par \par [address]\par [facility], 10th Floor\[place], [address]\par Office Tel: [phone]\par \par \par

## 2021-09-30 ENCOUNTER — APPOINTMENT (OUTPATIENT)
Dept: OTOLARYNGOLOGY | Facility: CLINIC | Age: 81
End: 2021-09-30
Payer: MEDICARE

## 2021-09-30 PROCEDURE — 92557 COMPREHENSIVE HEARING TEST: CPT

## 2021-09-30 PROCEDURE — 92550 TYMPANOMETRY & REFLEX THRESH: CPT

## 2022-09-09 NOTE — ED ADULT TRIAGE NOTE - NSWEIGHTCALCTOOLDRUG_GEN_A_CORE
used Opioid Pregnancy And Lactation Text: These medications can lead to premature delivery and should be avoided during pregnancy. These medications are also present in breast milk in small amounts.

## 2024-06-08 NOTE — ED ADULT TRIAGE NOTE - CCCP TRG CHIEF CMPLNT
Behavioral Health IP Nursing Progress Note    Suicidal Ideation:  denies     Current C-SSRS score: Negative Screen - White (06/07/24 1900)      Protective Factors / Reason for Living: Responsibility to children    Interventions:  Continue 15 minute safety rounds    Subjective: Patient reported feeling anxious. PRN hydroxyzine administered and effective. Patient denies suicidal ideation and self harm. Patient denies homicidal ideation. Patient denies auditory/visual hallucinations.     Objective: Patient appeared anxious. Patient was compliant with medications. Patient was observed in the lounge. She called on the phone this evening.     Medical:   none     PRN Medications given? Hydroxyzine and trazodone    Plan:   Treatment Plan reviewed and updated.       groin pain

## 2025-01-13 NOTE — ED PROVIDER NOTE - CROS ED SKIN ALL NEG
Patient's FSGs are controlled on current medication regimen.  Last A1c reviewed-   Lab Results   Component Value Date    HGBA1C 5.5 11/28/2024     Most recent fingerstick glucose reviewed-   Recent Labs   Lab 01/12/25  2118 01/13/25  0507 01/13/25  1114 01/13/25  1627   POCTGLUCOSE 226* 139* 168* 219*       Current correctional scale  Low  Maintain anti-hyperglycemic dose as follows-   Antihyperglycemics (From admission, onward)    Start     Stop Route Frequency Ordered    01/12/25 2100  insulin glargine U-100 (Lantus) pen 10 Units         -- SubQ Nightly 01/12/25 1830    01/10/25 1851  insulin aspart U-100 pen 0-5 Units         -- SubQ Before meals & nightly PRN 01/10/25 1752        Hold Oral hypoglycemics while patient is in the hospital.       negative...